# Patient Record
Sex: FEMALE | Race: WHITE | NOT HISPANIC OR LATINO | Employment: UNEMPLOYED | URBAN - METROPOLITAN AREA
[De-identification: names, ages, dates, MRNs, and addresses within clinical notes are randomized per-mention and may not be internally consistent; named-entity substitution may affect disease eponyms.]

---

## 2024-05-23 DIAGNOSIS — Z91.89 AT HIGH RISK FOR BREAST CANCER: Primary | ICD-10-CM

## 2024-06-19 RX ORDER — FLUTICASONE PROPIONATE 50 MCG
2 SPRAY, SUSPENSION (ML) NASAL
COMMUNITY

## 2024-06-19 RX ORDER — CETIRIZINE HYDROCHLORIDE 10 MG/1
10 TABLET ORAL DAILY
COMMUNITY

## 2024-06-19 RX ORDER — MONTELUKAST SODIUM 10 MG/1
10 TABLET ORAL DAILY
COMMUNITY

## 2024-06-19 RX ORDER — FLUOXETINE HYDROCHLORIDE 20 MG/1
20 CAPSULE ORAL DAILY
COMMUNITY

## 2024-06-19 NOTE — PROGRESS NOTES
[facility] Breast Surg  Breast Surgical Oncology  New Patient Office Visit - H&P      Referring Provider: Dr. Bella Montoya  PCP: Burke Pruett MD   Care Team:  OBGYN: No data on file.    Chief Complaint:   Chief Complaint   Patient presents with    Genetic Evaluation     Patient reports no breast pain, no swelling, no skin discoloration, patient has concerns regarding breast implants removal/safety        Subjective:     HPI:  Ashley Selby is a 34 y.o. female who presents on 6/20/2024 for evaluation of risk for breast cancer. Based on the Tyrer-Cuzick Breast Cancer Risk Model, her lifetime risk is calculated to be 29.4%.    Patient is doing well today. She currently denies any breast issues including rashes, redness, pain, swelling, nipple discharge, or new lumps/masses.  She has never undergone any breast imaging or biopsies. She has previously undergone bilateral breast augmentation with implants about 15 years ago.  She recently underwent genetic testing with her gynecologist which resulted as negative.  Patient has regular menstrual cycles.  She states she lives a relatively healthy lifestyle.  She does exercise intermittently but not daily.  Patient states she has smoked cigarettes in the past for about 2 years in her 20s, but she is no longer a smoker.  She drinks alcohol on occasion.  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    Of note, patient does have a significant family history of breast cancer in her mother who was diagnosed at age 60.  Patient's father was diagnosed with melanoma at age 60, and bladder cancer around age 70.  Patient's maternal grandmother was diagnosed with bladder cancer at age 72.  Patient has a maternal aunt who was diagnosed with breast cancer at age 65, and a paternal aunt who was diagnosed with breast cancer at age 65.    Imaging:   none    Pathology:   none    OB/GYN History:  Age at  Menarche Onset: 14  Menopausal Status: premenopausal, LMP: Patient's last menstrual period was 2024 (approximate).  Hysterectomy/Oophorectomy: NA  Hormonal birth control (duration): NA  Pregnancy History:   Age at first live birth: 24  Hormone Replacement Therapy: No, none    Other:  MG breast density: No breast composition recorded.   Prior thoracic RT: none  Genetic testing: Negative.   Ashkenazi Confucianist descent: No    Family History:  Family History   Problem Relation Name Age of Onset    Breast cancer Mother  60    Melanoma Father  60    Bladder Cancer Father  70    No Known Problems Sister      No Known Problems Brother      No Known Problems Maternal Grandmother      No Known Problems Maternal Grandfather      Bladder Cancer Paternal Grandmother  66 - 74    Breast cancer Maternal Aunt  65    Breast cancer Paternal Aunt  65        Patient History:  Past Medical History:   Diagnosis Date    Allergy        Past Surgical History:   Procedure Laterality Date    INSERTION OF BREAST IMPLANT Bilateral 2009    WISDOM TOOTH EXTRACTION         Social History     Socioeconomic History    Marital status:    Tobacco Use    Smoking status: Every Day     Types: Cigarettes    Smokeless tobacco: Never    Tobacco comments:     22-patient smokes about 3 cigarettes per month   Substance and Sexual Activity    Alcohol use: Yes     Comment: Socially    Drug use: Never    Sexual activity: Yes     Partners: Male     Social Determinants of Health     Financial Resource Strain: Low Risk  (2020)    Received from [facility] and [facility]    Overall Financial Resource Strain (CARDIA)     Difficulty of Paying Living Expenses: Not hard at all   Food Insecurity: Unknown (2020)    Received from [facility] and [facility]    Hunger Vital Sign     Worried About Running Out of Food in the Last  Year: Patient declined     Ran Out of Food in the Last Year: Patient declined   Transportation Needs: Unknown (8/26/2020)    [facility]    PRAPARE - Transportation     Lack of Transportation (Medical): Patient declined     Lack of Transportation (Non-Medical): Patient declined   Physical Activity: Unknown (8/26/2020)    [facility]    Exercise Vital Sign     Days of Exercise per Week: Patient declined     Minutes of Exercise per Session: Patient declined   Stress: No Stress Concern Present (8/26/2020)    Received from [facility] and [facility]    [employer] - Occupational Stress Questionnaire     Feeling of Stress : Not at all         There is no immunization history on file for this patient.    Medications/Allergies:    Current Outpatient Medications:     cetirizine (ZYRTEC) 10 MG tablet, Take 10 mg by mouth once daily., Disp: , Rfl:     FLUoxetine 20 MG capsule, Take 20 mg by mouth once daily., Disp: , Rfl:     fluticasone propionate (FLONASE) 50 mcg/actuation nasal spray, 2 sprays by Each Nostril route., Disp: , Rfl:     L.acid,gas,plan,rhm/B.ani/cran (UP4 PROBIOTICS WOMEN'S ORAL), Take by mouth., Disp: , Rfl:     montelukast (SINGULAIR) 10 mg tablet, Take 10 mg by mouth once daily., Disp: , Rfl:     multivit-minerals/folic acid (MULTIVITAMIN GUMMIES ORAL), Take by mouth., Disp: , Rfl:      Review of patient's allergies indicates:  No Known Allergies    Review of Systems:  All pertinent history mentioned in HPI.     Objective:     Vitals:  Vitals:    06/20/24 1010   BP: 109/75   BP Location: Left arm   Patient Position: Sitting   BP Method: Medium (Automatic)   Pulse: 65   Resp: 18   Temp: 97.9 °F (36.6 °C)   TempSrc: Oral   SpO2: 98%   Weight: 66.7  "kg (147 lb)   Height: 5' 7" (1.702 m)       Body mass index is 23.02 kg/m².     Physical Exam:  General: The patient is awake, alert and oriented times three. The patient is well nourished and in no acute distress.  Neck: There is no evidence of palpable cervical, supraclavicular or axillary adenopathy. The neck is supple. The thyroid is not enlarged.  Musculoskeletal: The patient has a normal range of motion of her bilateral upper extremities.  Chest: Examination of the chest wall fails to reveal any obvious abnormalities.  The lungs are clear to auscultation bilaterally without rales, rhonchi, or wheezing.  Cardiovascular: The heart has a regular rate and rhythm without murmurs, gallops or rubs.  Breast:  Bilateral saline implants in place.  Right:  Examination of right breast fails to reveal any dominant masses or areas of significant focal nodularity. The nipple is everted without evidence of discharge. There is no skin dimpling with movement of the pectoralis. There is no significant skin changes overlying the breast.   Left:  Examination of the left breast fails to reveal any dominant masses or areas of significant focal nodularity. The nipple is everted without evidence of discharge. There is no skin dimpling with movement of the pectoralis. There are no significant skin changes overlying the breast.  Abdomen: The abdomen is soft, flat, nontender and nondistended with no palpable masses or organomegaly.  Integumentary: no rashes or skin lesions present  Neurologic: cranial nerves intact, no signs of peripheral neurological deficit, motor/sensory function intact    Assessment:     There is no problem list on file for this patient.       Ashley was seen today for genetic evaluation.    Diagnoses and all orders for this visit:    At high risk for breast cancer  -     Ambulatory referral/consult to Breast Surgery  -     Mammo Digital Screening Bilat w/ Errol; Future    Screening mammogram for breast cancer  -     " Mammo Digital Screening Bilat w/ Errol; Future    Family history of breast cancer  -     Mammo Digital Screening Bilat w/ Errol; Future       --------------------------------------------------------------------------------------------------------------  After the initial clinical evaluation nearly 30 minutes were on counseling the patients regarding the options for management. Risk reduction strategies were discussed.     1. Lifestyle factors: As with other types of cancer, studies continue to show that various lifestyle factors may contribute to the development of breast cancer.     Weight: Recent studies have shown that postmenopausal women who are overweight or obese have an increased risk of breast cancer. These women also have a higher risk of having the cancer come back after treatment.     Physical activity: Decreased physical activity is associated with an increased risk of developing breast cancer and a higher risk of having the cancer come back after treatment. Regular physical activity may protect against breast cancer by helping women maintain a healthy body weight, lowering hormone levels, or causing changes in a womens metabolism or immune factors.     Alcohol: Current research suggests that having more than 1 to 2 alcoholic drinks, including beer, wine, and spirits, per day raises the risk of breast cancer, as well as the risk of having the cancer come back after treatment.     Food: There is no reliable research that confirms that eating or avoiding specific foods reduces the risk of developing breast cancer or having the cancer come back after treatment. However, eating more fruits and vegetables and fewer animal fats is linked with many health benefits.     2. Prevention:  Surgery to lower cancer risk: For women with BRCA1 or BRCA2 genetic mutations, which substantially increase the risk of breast cancer, preventive removal of the breasts may be considered. The procedure, called a prophylactic  "mastectomy, appears to reduce the risk of developing breast cancer by at least 95%. Women with these mutations should also consider the preventive removal of the ovaries and fallopian tubes, called a prophylactic salpingo-oophorectomy. This procedure can reduce the risk of developing ovarian cancer, as well as breast cancer, by stopping the ovaries from making estrogen.      Drugs to lower cancer risk (Chemoprevention): Women who have a higher than usual risk of developing breast cancer may consider certain drugs that may help prevent breast cancer. This approach may also be called "chemoprevention." For breast cancer, this is the use of hormone-blocking drugs to reduce cancer risk. The drugs, tamoxifen (Soltamox) and raloxifene (Evista), are approved by the U.S. Food and Drug Administration (FDA) to lower breast cancer risk. These drugs are called selective estrogen receptor modulators (SERMs) and are not chemotherapy. A SERM is a medication that blocks estrogen receptors in some tissues and not others. Both women who have gone through menopause and those who have not may take tamoxifen. Raloxifene is only approved for women who have gone through menopause. Each drug also has different side effects.     Aromatase inhibitors (AIs) have also been shown to lower breast cancer risk. AIs are a type of hormone-blocking treatment that reduces the amount of estrogen in a woman's body by stopping tissues and organs other than the ovaries from producing estrogen. They can only be used by women who have gone through menopause. However, no AIs have been approved by the FDA for lowering breast cancer risk in women who do not have the disease.     3. Surveillance: Women with a known genetic mutation should follow screening guidelines per the NCCN guidelines. Women with no known genetic mutation  and found to be at greater than 20 percent average lifetime risk of breast cancer are recommended for the following screening " recommendations:     Clinical Breast exam: Every 6-12 months starting at age found to be at increased risk by risk model     Mammogram: Per NCCN guidelines, recommended every year starting 10 years younger than the youngest breast cancer case in the family (but not before age 30). May consider beginning breast MRIs at age 30 per ACR guidelines if desired by patient or other clinical considerations. May also consider getting a baseline MG at time of initial high risk consultation, if not already obtained.     Breast MRI: Per NCCN guidelines, recommended every year starting 10 years younger than the youngest breast cancer case in the family (but not before age 25). May consider beginning breast MRIs at age 30 per ACR guidelines if desired by patient or other clinical considerations. If patient has a first-degree relative with a BRCA1/2 gene mutation, youre encouraged to get genetic counseling and/or testing before getting MRI as part of screening (for those who do not wish to have genetic testing, MRI is recommended). Breast MRI in combination with mammography is better than mammography alone at finding breast cancer in certain women at higher than average risk.     --------------------------------------------------------------------------------------------------------------  Plan:       1. Lifestyle - Healthy lifestyle guidelines were reviewed. She was encouraged to engage in regular exercise, maintain a healthy body weight, and avoid excessive alcohol consumption. Healthy nutritional guidelines were also discussed. Self-breast examination was reviewed with the patient in detail and she was encouraged to perform this on a monthly basis.    2. Surveillance - All high-risk screening options were discussed with patient.  Discussed with patient that according to NCCN guidelines, she should start undergoing high-risk screening at the average age of 40 being at her youngest known relative to be diagnosed with breast  cancer was her mother who was diagnosed at age 60.  However, according to ACR guidelines, patient can start high-risk screening beginning at age 30 being that patient is considered high-risk.  Patient states she would like to undergo baseline screening mammogram.  I recommend baseline screening mammogram next available.  We will see patient back in about 6 months for CBE.    3. Prevention - We had a brief discussion/education about indications for preventative mastectomy or chemoprevention.  These methods are not recommended to her at this time.    4. Genetics - Patient has already undergone genetic testing with her gynecologist which resulted as negative.    5. Other routine screening exams:   -  Recommend annual follow up with PCP.   -  Recommend annual follow up with GYN for pap smears/gynecologic exams and CBE.  We will see patient back in about 6 months in order to establish clinical breast exam every 6 months scheduled to myself in her gynecologist.   -  GI: Recommend start colorectal cancer screening at age 45 in average risk individuals. This can be done either with a sensitive test that looks for signs of cancer in a persons stool (a stool-based test), or with an exam that looks at the colon and rectum (a visual exam). Patient's at an increased risk for CRC (ex. Personal or family history of CRC, certain types of polyps, genetic disorders, IBD, or hx of abdominal radiation) should start screening prior to age 45.    - Smokers: USPSTF recommends LDCT in adults aged 50 to 80 years who have a 20 pack-year smoking history and currently smoke or have quit within the past 15 years. Patient does not meet criteria for this screening.          All of her questions were answered. She was advised to call if she develops any questions or concerns.    Юлия Reed PA-C     --------------------------------------------------------------------------------------------------------------  Total time on the date of the visit  ranged from 60-74 mins (15995). Total time includes both face-to-face and non-face-to-face time personally spent by myself on the day of the visit.    Non-face-to-face time included:  _X_ preparing to see the patient such as reviewing the patient record  _X_ obtaining and reviewing separately obtained history  _X_ independently interpreting results  _X_ documenting clinical information in electronic health record.    Face-to-face time included:  _X_ performing an appropriate history and examination  _X_ communicating results to the patient  _X_ counseling and educating the patient  __ ordering appropriate medications  _x_ ordering appropriate tests  _X_ ordering appropriate procedures (including follow-up)  _X_ answering any questions the patient had    Total Time spent on date of visit: 60 minutes       Home

## 2024-06-20 ENCOUNTER — OFFICE VISIT (OUTPATIENT)
Dept: SURGERY | Facility: CLINIC | Age: 34
End: 2024-06-20
Payer: COMMERCIAL

## 2024-06-20 VITALS
WEIGHT: 147 LBS | HEIGHT: 67 IN | OXYGEN SATURATION: 98 % | DIASTOLIC BLOOD PRESSURE: 75 MMHG | RESPIRATION RATE: 18 BRPM | TEMPERATURE: 98 F | HEART RATE: 65 BPM | SYSTOLIC BLOOD PRESSURE: 109 MMHG | BODY MASS INDEX: 23.07 KG/M2

## 2024-06-20 DIAGNOSIS — Z12.31 SCREENING MAMMOGRAM FOR BREAST CANCER: ICD-10-CM

## 2024-06-20 DIAGNOSIS — Z91.89 AT HIGH RISK FOR BREAST CANCER: Primary | ICD-10-CM

## 2024-06-20 DIAGNOSIS — Z80.3 FAMILY HISTORY OF BREAST CANCER: ICD-10-CM

## 2024-06-20 PROCEDURE — 99999 PR PBB SHADOW E&M-EST. PATIENT-LVL IV: CPT | Mod: PBBFAC,,,

## 2024-06-20 PROCEDURE — 3074F SYST BP LT 130 MM HG: CPT | Mod: CPTII,S$GLB,,

## 2024-06-20 PROCEDURE — 1159F MED LIST DOCD IN RCRD: CPT | Mod: CPTII,S$GLB,,

## 2024-06-20 PROCEDURE — 1160F RVW MEDS BY RX/DR IN RCRD: CPT | Mod: CPTII,S$GLB,,

## 2024-06-20 PROCEDURE — 3008F BODY MASS INDEX DOCD: CPT | Mod: CPTII,S$GLB,,

## 2024-06-20 PROCEDURE — 99205 OFFICE O/P NEW HI 60 MIN: CPT | Mod: S$GLB,,,

## 2024-06-20 PROCEDURE — 3078F DIAST BP <80 MM HG: CPT | Mod: CPTII,S$GLB,,
